# Patient Record
Sex: MALE | Race: WHITE
[De-identification: names, ages, dates, MRNs, and addresses within clinical notes are randomized per-mention and may not be internally consistent; named-entity substitution may affect disease eponyms.]

---

## 2017-01-14 ENCOUNTER — HOSPITAL ENCOUNTER (EMERGENCY)
Dept: HOSPITAL 84 - D.ER | Age: 67
Discharge: HOME | End: 2017-01-14
Payer: MEDICARE

## 2017-01-14 DIAGNOSIS — J20.9: ICD-10-CM

## 2017-01-14 DIAGNOSIS — R09.1: Primary | ICD-10-CM

## 2017-01-14 DIAGNOSIS — J44.9: ICD-10-CM

## 2017-01-14 DIAGNOSIS — M94.0: ICD-10-CM

## 2017-01-14 LAB
ALBUMIN SERPL-MCNC: 3.7 G/DL (ref 3.4–5)
ALP SERPL-CCNC: 93 U/L (ref 46–116)
ALT SERPL-CCNC: 28 U/L (ref 10–68)
ANION GAP SERPL CALC-SCNC: 10.4 MMOL/L (ref 8–16)
BASOPHILS NFR BLD AUTO: 0.5 % (ref 0–2)
BILIRUB SERPL-MCNC: 0.82 MG/DL (ref 0.2–1.3)
BUN SERPL-MCNC: 12 MG/DL (ref 7–18)
CALCIUM SERPL-MCNC: 9.8 MG/DL (ref 8.5–10.1)
CHLORIDE SERPL-SCNC: 104 MMOL/L (ref 98–107)
CHOLEST/HDLC SERPL: 4.6 RATIO (ref 2.3–4.9)
CK MB SERPL-MCNC: 1.1 U/L (ref 0–3.6)
CK SERPL-CCNC: 75 UL (ref 21–232)
CO2 SERPL-SCNC: 29.8 MMOL/L (ref 21–32)
CREAT SERPL-MCNC: 1.2 MG/DL (ref 0.6–1.3)
EOSINOPHIL NFR BLD: 2.7 % (ref 0–7)
ERYTHROCYTE [DISTWIDTH] IN BLOOD BY AUTOMATED COUNT: 12.1 % (ref 11.5–14.5)
GLOBULIN SER-MCNC: 3.5 G/L
GLUCOSE SERPL-MCNC: 109 MG/DL (ref 74–106)
HCT VFR BLD CALC: 47.6 % (ref 42–54)
HDLC SERPL-MCNC: 59 MG/DL (ref 32–96)
HGB BLD-MCNC: 16.2 G/DL (ref 13.5–17.5)
IMM GRANULOCYTES NFR BLD: 0.2 % (ref 0–5)
LDL-HDL RATIO: 3.2 RATIO (ref 1.5–3.5)
LDLC SERPL-MCNC: 191 MG/DL (ref 0–100)
LYMPHOCYTES NFR BLD AUTO: 30.2 % (ref 15–50)
MCH RBC QN AUTO: 32.7 PG (ref 26–34)
MCHC RBC AUTO-ENTMCNC: 34 G/DL (ref 31–37)
MCV RBC: 96 FL (ref 80–100)
MONOCYTES NFR BLD: 7.4 % (ref 2–11)
NEUTROPHILS NFR BLD AUTO: 59 % (ref 40–80)
OSMOLALITY SERPL CALC.SUM OF ELEC: 279 MOSM/KG (ref 275–300)
PLATELET # BLD: 145 10X3/UL (ref 130–400)
PMV BLD AUTO: 10.5 FL (ref 7.4–10.4)
POTASSIUM SERPL-SCNC: 4.2 MMOL/L (ref 3.5–5.1)
PROT SERPL-MCNC: 7.2 G/DL (ref 6.4–8.2)
RBC # BLD AUTO: 4.96 10X6/UL (ref 4.2–6.1)
SODIUM SERPL-SCNC: 140 MMOL/L (ref 136–145)
TRIGL SERPL-MCNC: 121 MG/DL (ref 30–200)
TROPONIN I SERPL-MCNC: < 0.017 NG/ML (ref 0–0.06)
WBC # BLD AUTO: 6.2 10X3/UL (ref 4.8–10.8)

## 2017-02-26 ENCOUNTER — HOSPITAL ENCOUNTER (EMERGENCY)
Dept: HOSPITAL 84 - D.ER | Age: 67
Discharge: HOME | End: 2017-02-26
Payer: MEDICARE

## 2017-02-26 DIAGNOSIS — J44.1: Primary | ICD-10-CM

## 2017-02-26 DIAGNOSIS — M94.0: ICD-10-CM

## 2017-02-26 DIAGNOSIS — R00.0: ICD-10-CM

## 2017-02-26 LAB
ALBUMIN SERPL-MCNC: 4.1 G/DL (ref 3.4–5)
ALP SERPL-CCNC: 107 U/L (ref 46–116)
ALT SERPL-CCNC: 32 U/L (ref 10–68)
ANION GAP SERPL CALC-SCNC: 14.8 MMOL/L (ref 8–16)
BASOPHILS NFR BLD AUTO: 0.4 % (ref 0–2)
BILIRUB SERPL-MCNC: 0.8 MG/DL (ref 0.2–1.3)
BUN SERPL-MCNC: 7 MG/DL (ref 7–18)
CALCIUM SERPL-MCNC: 9.2 MG/DL (ref 8.5–10.1)
CHLORIDE SERPL-SCNC: 103 MMOL/L (ref 98–107)
CO2 SERPL-SCNC: 28.1 MMOL/L (ref 21–32)
CREAT SERPL-MCNC: 1.4 MG/DL (ref 0.6–1.3)
EOSINOPHIL NFR BLD: 2.3 % (ref 0–7)
ERYTHROCYTE [DISTWIDTH] IN BLOOD BY AUTOMATED COUNT: 13 % (ref 11.5–14.5)
GLOBULIN SER-MCNC: 3.8 G/L
GLUCOSE SERPL-MCNC: 110 MG/DL (ref 74–106)
HCT VFR BLD CALC: 50.4 % (ref 42–54)
HGB BLD-MCNC: 17.3 G/DL (ref 13.5–17.5)
IMM GRANULOCYTES NFR BLD: 0.4 % (ref 0–5)
LYMPHOCYTES NFR BLD AUTO: 18.9 % (ref 15–50)
MCH RBC QN AUTO: 33.3 PG (ref 26–34)
MCHC RBC AUTO-ENTMCNC: 34.3 G/DL (ref 31–37)
MCV RBC: 97.1 FL (ref 80–100)
MONOCYTES NFR BLD: 12 % (ref 2–11)
NEUTROPHILS NFR BLD AUTO: 66 % (ref 40–80)
OSMOLALITY SERPL CALC.SUM OF ELEC: 281 MOSM/KG (ref 275–300)
PLATELET # BLD: 114 10X3/UL (ref 130–400)
PMV BLD AUTO: 10.6 FL (ref 7.4–10.4)
POTASSIUM SERPL-SCNC: 3.9 MMOL/L (ref 3.5–5.1)
PROT SERPL-MCNC: 7.9 G/DL (ref 6.4–8.2)
RBC # BLD AUTO: 5.19 10X6/UL (ref 4.2–6.1)
SODIUM SERPL-SCNC: 142 MMOL/L (ref 136–145)
WBC # BLD AUTO: 8.3 10X3/UL (ref 4.8–10.8)

## 2017-09-01 ENCOUNTER — HOSPITAL ENCOUNTER (OUTPATIENT)
Dept: HOSPITAL 84 - D.NM | Age: 67
Discharge: HOME | End: 2017-09-01
Attending: FAMILY MEDICINE
Payer: MEDICARE

## 2017-09-01 DIAGNOSIS — R07.89: Primary | ICD-10-CM

## 2017-10-14 ENCOUNTER — HOSPITAL ENCOUNTER (EMERGENCY)
Dept: HOSPITAL 84 - D.ER | Age: 67
Discharge: HOME | End: 2017-10-14
Payer: MEDICARE

## 2017-10-14 DIAGNOSIS — J44.9: ICD-10-CM

## 2017-10-14 DIAGNOSIS — R55: Primary | ICD-10-CM

## 2017-10-14 DIAGNOSIS — F10.129: ICD-10-CM

## 2017-10-14 LAB
ALBUMIN SERPL-MCNC: 3.9 G/DL (ref 3.4–5)
ALP SERPL-CCNC: 119 U/L (ref 46–116)
ALT SERPL-CCNC: 41 U/L (ref 10–68)
ANION GAP SERPL CALC-SCNC: 17.1 MMOL/L (ref 8–16)
BASOPHILS NFR BLD AUTO: 0.5 % (ref 0–2)
BILIRUB SERPL-MCNC: 0.68 MG/DL (ref 0.2–1.3)
BUN SERPL-MCNC: 12 MG/DL (ref 7–18)
CALCIUM SERPL-MCNC: 8.9 MG/DL (ref 8.5–10.1)
CHLORIDE SERPL-SCNC: 101 MMOL/L (ref 98–107)
CK SERPL-CCNC: 96 UL (ref 21–232)
CO2 SERPL-SCNC: 21.5 MMOL/L (ref 21–32)
CREAT SERPL-MCNC: 1.2 MG/DL (ref 0.6–1.3)
EOSINOPHIL NFR BLD: 4.8 % (ref 0–7)
ERYTHROCYTE [DISTWIDTH] IN BLOOD BY AUTOMATED COUNT: 12.8 % (ref 11.5–14.5)
GLOBULIN SER-MCNC: 3.7 G/L
GLUCOSE SERPL-MCNC: 87 MG/DL (ref 74–106)
HCT VFR BLD CALC: 47 % (ref 42–54)
HGB BLD-MCNC: 16.3 G/DL (ref 13.5–17.5)
IMM GRANULOCYTES NFR BLD: 0.1 % (ref 0–5)
LYMPHOCYTES NFR BLD AUTO: 29.9 % (ref 15–50)
MCH RBC QN AUTO: 32.9 PG (ref 26–34)
MCHC RBC AUTO-ENTMCNC: 34.7 G/DL (ref 31–37)
MCV RBC: 94.8 FL (ref 80–100)
MONOCYTES NFR BLD: 7.9 % (ref 2–11)
NEUTROPHILS NFR BLD AUTO: 56.8 % (ref 40–80)
OSMOLALITY SERPL CALC.SUM OF ELEC: 270 MOSM/KG (ref 275–300)
PLATELET # BLD: 164 10X3/UL (ref 130–400)
PMV BLD AUTO: 10.9 FL (ref 7.4–10.4)
POTASSIUM SERPL-SCNC: 3.6 MMOL/L (ref 3.5–5.1)
PROT SERPL-MCNC: 7.6 G/DL (ref 6.4–8.2)
RBC # BLD AUTO: 4.96 10X6/UL (ref 4.2–6.1)
SODIUM SERPL-SCNC: 136 MMOL/L (ref 136–145)
TROPONIN I SERPL-MCNC: < 0.017 NG/ML (ref 0–0.06)
WBC # BLD AUTO: 7.6 10X3/UL (ref 4.8–10.8)

## 2018-01-29 ENCOUNTER — HOSPITAL ENCOUNTER (OUTPATIENT)
Dept: HOSPITAL 84 - D.LABREF | Age: 68
Discharge: HOME | End: 2018-01-29
Attending: UROLOGY
Payer: MEDICARE

## 2018-01-29 DIAGNOSIS — N39.0: Primary | ICD-10-CM

## 2018-02-13 ENCOUNTER — HOSPITAL ENCOUNTER (OUTPATIENT)
Dept: HOSPITAL 84 - D.OPS | Age: 68
Discharge: HOME | End: 2018-02-13
Attending: UROLOGY
Payer: MEDICARE

## 2018-02-13 VITALS — BODY MASS INDEX: 30.4 KG/M2

## 2018-02-13 DIAGNOSIS — Z01.811: ICD-10-CM

## 2018-02-13 DIAGNOSIS — Z01.810: ICD-10-CM

## 2018-02-13 DIAGNOSIS — Z53.9: Primary | ICD-10-CM

## 2018-02-13 DIAGNOSIS — Z01.812: ICD-10-CM

## 2018-02-13 LAB
ERYTHROCYTE [DISTWIDTH] IN BLOOD BY AUTOMATED COUNT: 12.9 % (ref 11.5–14.5)
HCT VFR BLD CALC: 46.5 % (ref 42–54)
HGB BLD-MCNC: 15.8 G/DL (ref 13.5–17.5)
MCH RBC QN AUTO: 32.6 PG (ref 26–34)
MCHC RBC AUTO-ENTMCNC: 34 G/DL (ref 31–37)
MCV RBC: 95.9 FL (ref 80–100)
PLATELET # BLD: 149 10X3/UL (ref 130–400)
PMV BLD AUTO: 10.5 FL (ref 7.4–10.4)
RBC # BLD AUTO: 4.85 10X6/UL (ref 4.2–6.1)
WBC # BLD AUTO: 9.4 10X3/UL (ref 4.8–10.8)

## 2018-02-20 ENCOUNTER — HOSPITAL ENCOUNTER (OUTPATIENT)
Dept: HOSPITAL 84 - D.CATH | Age: 68
Discharge: HOME | End: 2018-02-20
Attending: INTERNAL MEDICINE
Payer: MEDICARE

## 2018-02-20 VITALS
WEIGHT: 200.42 LBS | HEIGHT: 68 IN | BODY MASS INDEX: 30.38 KG/M2 | WEIGHT: 200.42 LBS | BODY MASS INDEX: 30.38 KG/M2 | HEIGHT: 68 IN

## 2018-02-20 VITALS — DIASTOLIC BLOOD PRESSURE: 94 MMHG | SYSTOLIC BLOOD PRESSURE: 159 MMHG

## 2018-02-20 DIAGNOSIS — I20.9: Primary | ICD-10-CM

## 2018-02-20 DIAGNOSIS — R94.31: ICD-10-CM

## 2018-02-20 DIAGNOSIS — J44.9: ICD-10-CM

## 2018-02-20 DIAGNOSIS — Z01.812: ICD-10-CM

## 2018-02-20 LAB
ANION GAP SERPL CALC-SCNC: 14.9 MMOL/L (ref 8–16)
BUN SERPL-MCNC: 16 MG/DL (ref 7–18)
CALCIUM SERPL-MCNC: 8.9 MG/DL (ref 8.5–10.1)
CHLORIDE SERPL-SCNC: 103 MMOL/L (ref 98–107)
CO2 SERPL-SCNC: 22.6 MMOL/L (ref 21–32)
CREAT SERPL-MCNC: 1.2 MG/DL (ref 0.6–1.3)
ERYTHROCYTE [DISTWIDTH] IN BLOOD BY AUTOMATED COUNT: 12.6 % (ref 11.5–14.5)
GLUCOSE SERPL-MCNC: 109 MG/DL (ref 74–106)
HCT VFR BLD CALC: 42.8 % (ref 42–54)
HGB BLD-MCNC: 15.1 G/DL (ref 13.5–17.5)
LYMPHOCYTES NFR BLD AUTO: 13.9 % (ref 15–50)
MCH RBC QN AUTO: 31.9 PG (ref 26–34)
MCHC RBC AUTO-ENTMCNC: 35.3 G/DL (ref 31–37)
MCV RBC: 90.5 FL (ref 80–100)
NEUTROPHILS NFR BLD AUTO: 79.4 % (ref 40–80)
OSMOLALITY SERPL CALC.SUM OF ELEC: 275 MOSM/KG (ref 275–300)
PLATELET # BLD: 136 10X3/UL (ref 130–400)
PMV BLD AUTO: 10.4 FL (ref 7.4–10.4)
POTASSIUM SERPL-SCNC: 3.5 MMOL/L (ref 3.5–5.1)
RBC # BLD AUTO: 4.73 10X6/UL (ref 4.2–6.1)
SODIUM SERPL-SCNC: 137 MMOL/L (ref 136–145)
WBC # BLD AUTO: 9 10X3/UL (ref 4.8–10.8)

## 2018-02-21 ENCOUNTER — HOSPITAL ENCOUNTER (OUTPATIENT)
Dept: HOSPITAL 84 - D.RT | Age: 68
Discharge: HOME | End: 2018-02-21
Attending: INTERNAL MEDICINE
Payer: MEDICARE

## 2018-02-21 VITALS — BODY MASS INDEX: 30.4 KG/M2

## 2018-02-21 DIAGNOSIS — R06.02: Primary | ICD-10-CM

## 2018-03-15 ENCOUNTER — HOSPITAL ENCOUNTER (OUTPATIENT)
Dept: HOSPITAL 84 - D.LABREF | Age: 68
Discharge: HOME | End: 2018-03-15
Attending: INTERNAL MEDICINE
Payer: MEDICARE

## 2018-03-15 VITALS — BODY MASS INDEX: 30.4 KG/M2

## 2018-03-15 DIAGNOSIS — J44.9: Primary | ICD-10-CM

## 2018-03-15 LAB
BASOPHILS NFR BLD AUTO: 0.2 % (ref 0–2)
EOSINOPHIL NFR BLD: 0.9 % (ref 0–7)
ERYTHROCYTE [DISTWIDTH] IN BLOOD BY AUTOMATED COUNT: 14.7 % (ref 11.5–14.5)
HCT VFR BLD CALC: 49.9 % (ref 42–54)
HGB BLD-MCNC: 16.4 G/DL (ref 13.5–17.5)
IMM GRANULOCYTES NFR BLD: 0.6 % (ref 0–5)
LYMPHOCYTES NFR BLD AUTO: 20.9 % (ref 15–50)
MCH RBC QN AUTO: 32.5 PG (ref 26–34)
MCHC RBC AUTO-ENTMCNC: 32.9 G/DL (ref 31–37)
MCV RBC: 99 FL (ref 80–100)
MONOCYTES NFR BLD: 9.2 % (ref 2–11)
NEUTROPHILS NFR BLD AUTO: 68.2 % (ref 40–80)
PLATELET # BLD: 134 10X3/UL (ref 130–400)
PMV BLD AUTO: 11.3 FL (ref 7.4–10.4)
RBC # BLD AUTO: 5.04 10X6/UL (ref 4.2–6.1)
WBC # BLD AUTO: 8.7 10X3/UL (ref 4.8–10.8)

## 2018-04-11 ENCOUNTER — HOSPITAL ENCOUNTER (OUTPATIENT)
Dept: HOSPITAL 84 - D.CT | Age: 68
Discharge: HOME | End: 2018-04-11
Attending: INTERNAL MEDICINE
Payer: MEDICARE

## 2018-04-11 VITALS — BODY MASS INDEX: 30.4 KG/M2

## 2018-04-11 DIAGNOSIS — R06.00: Primary | ICD-10-CM

## 2018-07-25 ENCOUNTER — HOSPITAL ENCOUNTER (OUTPATIENT)
Dept: HOSPITAL 84 - D.OPS | Age: 68
Discharge: HOME | End: 2018-07-25
Attending: UROLOGY
Payer: MEDICARE

## 2018-07-25 VITALS — BODY MASS INDEX: 30.01 KG/M2 | HEIGHT: 68 IN | WEIGHT: 198 LBS | BODY MASS INDEX: 30.01 KG/M2

## 2018-07-25 VITALS — DIASTOLIC BLOOD PRESSURE: 89 MMHG | SYSTOLIC BLOOD PRESSURE: 138 MMHG

## 2018-07-25 DIAGNOSIS — R39.15: ICD-10-CM

## 2018-07-25 DIAGNOSIS — R97.20: Primary | ICD-10-CM

## 2018-07-25 DIAGNOSIS — R39.12: ICD-10-CM

## 2018-07-25 DIAGNOSIS — N42.31: ICD-10-CM

## 2018-07-25 DIAGNOSIS — Z01.812: ICD-10-CM

## 2018-07-25 DIAGNOSIS — R35.0: ICD-10-CM

## 2018-07-25 DIAGNOSIS — N40.1: ICD-10-CM

## 2018-07-25 LAB
ERYTHROCYTE [DISTWIDTH] IN BLOOD BY AUTOMATED COUNT: 15.1 % (ref 11.5–14.5)
HCT VFR BLD CALC: 43.1 % (ref 42–54)
HGB BLD-MCNC: 14.9 G/DL (ref 13.5–17.5)
MCH RBC QN AUTO: 34 PG (ref 26–34)
MCHC RBC AUTO-ENTMCNC: 34.6 G/DL (ref 31–37)
MCV RBC: 98.4 FL (ref 80–100)
PLATELET # BLD: 140 10X3/UL (ref 130–400)
PMV BLD AUTO: 10.1 FL (ref 7.4–10.4)
RBC # BLD AUTO: 4.38 10X6/UL (ref 4.2–6.1)
WBC # BLD AUTO: 6.5 10X3/UL (ref 4.8–10.8)

## 2019-01-21 ENCOUNTER — HOSPITAL ENCOUNTER (OUTPATIENT)
Dept: HOSPITAL 84 - D.MRI | Age: 69
Discharge: HOME | End: 2019-01-21
Attending: FAMILY MEDICINE
Payer: MEDICARE

## 2019-01-21 VITALS — BODY MASS INDEX: 30.1 KG/M2

## 2019-01-21 DIAGNOSIS — R42: Primary | ICD-10-CM

## 2019-03-21 ENCOUNTER — HOSPITAL ENCOUNTER (OUTPATIENT)
Dept: HOSPITAL 84 - D.HCCARDIO | Age: 69
Discharge: HOME | End: 2019-03-21
Attending: INTERNAL MEDICINE
Payer: MEDICARE

## 2019-03-21 VITALS — BODY MASS INDEX: 30.1 KG/M2

## 2019-03-21 DIAGNOSIS — I25.10: Primary | ICD-10-CM

## 2019-03-24 NOTE — EC
PATIENT:ARIAS ZUÑIGA                  DATE OF SERVICE: 03/21/19
SEX: M                                  MEDICAL RECORD: [id number]
DATE OF BIRTH: [birth year]                        [facility]          
AGE OF PATIENT: 68                             ADMISSION DATE: 03/21/19
 
REFERRING PHYSICIAN:                               
 
INTERPRETING PHYSICIAN: ROSE CASH MD          
 
 
 
                             ECHOCARDIOGRAM REPORT
  ECHO CHARGES 4               ECHO COMPLETE                 Date: 03/21/19
 
 
 
CLINICAL DIAGNOSIS: CAD    HX OF CAD/COPD/HTN     
 
                         ECHOCARDIOGRAPHIC MEASUREMENTS
      (adult normal given)
   AC root (d.<3.7cm) 4.8  cm   LV Septum d (<1.2 cm> 1.7  cm
      Valve Excursion 2.0  cm     LV Septum (systole) 2.0  cm
Left Atria (s.<4.0cm> 3.1  cm          LVPW d(<1.2cm) 1.8  cm
        RV (d.<2.3cm) 2.6  cm           LVPW (sytole) 2.2  cm
  LV diastole(<5.6CM) 5.3  cm       MV E-F(>70mm/sec)      cm
           LV systole 3.5  cm           LVOT Diameter 2.3  cm
       MV exc.(>10mm)      cm
Est.ejection fraction (50-75%)     %
 
   DOPPLER:
     LVIT      cm/sec A 96.0 cm/sec E 81.0  cm/sec
       LA      cm/sec      RVSP 21   mmHg
     LVOT 110  cm/sec   AOP1/2T 654  m/s
  Asc. Ao 118  cm/sec
     RVOT      cm/sec
       RA      cm/sec
       PA      cm/sec
 AV Gradient Peak 5.59 mmHg  AV Mean 2.94 mmHg  AV Area 4.2  cm
 MV Gradient Peak 5.34 mmHg  MV Mean 2.01 mmHg  MV Area      cm
   COMMENTS:                                              
 
 
 Cardiac Sonographer: 2               DMITRI CRAWFORD              
      Cardiologist: 3          Dr. Adame             
             TAPE# PACS           
                                       Pericardial Effusion N                        
 
 
DATE OF SERVICE:  
 
Adequate 2-D, color-flow and spectral Doppler, and M-mode. 
 
LVH is present.  LV internal dimensions are normal.  Wall motion is normal.  EF
is greater than or equal to 55%.  Aortic valve is tricuspid.  No evidence of
stenosis by Doppler interrogation.  Left atrium is normal at 3.7 cm.  Mitral
valve shows no prolapse.  Trace MR.  Right-sided chambers are grossly normal. 
Trace TR.
 
 
 
 
ECHOCARDIOGRAM REPORT                          [id number]    ARIAS ZUÑIGA          
 
 
TRANSINT:[id number] Voice Confirmation ID: [id number] DOCUMENT ID: [id number]
                                           
                                           ROSE CASH MD          
 
 
 
Electronically Signed by ROSE CASH on 03/24/19 at 1039
 
 
 
 
 
 
 
 
 
 
 
 
 
 
 
 
 
 
 
 
 
 
 
 
 
 
 
 
 
 
 
 
 
 
 
 
 
 
 
CC:                                                             [id number]
DICTATION DATE: 03/22/19 1203     :     03/22/19 1240      DEP CLI 
                                                                      03/21/19
[place] [id number] [place] [phone]

## 2019-12-14 ENCOUNTER — HOSPITAL ENCOUNTER (EMERGENCY)
Dept: HOSPITAL 84 - D.ER | Age: 69
Discharge: HOME | End: 2019-12-14
Payer: MEDICARE

## 2019-12-14 VITALS — HEIGHT: 68 IN | BODY MASS INDEX: 33.41 KG/M2 | WEIGHT: 220.46 LBS

## 2019-12-14 VITALS — DIASTOLIC BLOOD PRESSURE: 72 MMHG | SYSTOLIC BLOOD PRESSURE: 122 MMHG

## 2019-12-14 DIAGNOSIS — R33.9: Primary | ICD-10-CM

## 2019-12-14 LAB
APPEARANCE UR: CLEAR
BILIRUB SERPL-MCNC: NEGATIVE MG/DL
COLOR UR: YELLOW
GLUCOSE SERPL-MCNC: NEGATIVE MG/DL
KETONES UR STRIP-MCNC: NEGATIVE MG/DL
NITRITE UR-MCNC: NEGATIVE MG/ML
PH UR STRIP: 5 [PH] (ref 5–6)
PROT UR-MCNC: NEGATIVE MG/DL
SP GR UR STRIP: 1.01 (ref 1–1.02)
UROBILINOGEN UR-MCNC: NORMAL MG/DL

## 2020-01-21 LAB
ERYTHROCYTE [DISTWIDTH] IN BLOOD BY AUTOMATED COUNT: 13 % (ref 11.5–14.5)
HCT VFR BLD CALC: 50.7 % (ref 42–54)
HGB BLD-MCNC: 17 G/DL (ref 13.5–17.5)
MCH RBC QN AUTO: 31.7 PG (ref 26–34)
MCHC RBC AUTO-ENTMCNC: 33.5 G/DL (ref 31–37)
MCV RBC: 94.4 FL (ref 80–100)
PLATELET # BLD: 173 10X3/UL (ref 130–400)
PMV BLD AUTO: 10.3 FL (ref 7.4–10.4)
RBC # BLD AUTO: 5.37 10X6/UL (ref 4.2–6.1)
WBC # BLD AUTO: 7.6 10X3/UL (ref 4.8–10.8)

## 2020-01-23 ENCOUNTER — HOSPITAL ENCOUNTER (OUTPATIENT)
Dept: HOSPITAL 84 - D.OPS | Age: 70
Discharge: HOME | End: 2020-01-23
Attending: UROLOGY
Payer: MEDICARE

## 2020-01-23 VITALS — SYSTOLIC BLOOD PRESSURE: 132 MMHG | DIASTOLIC BLOOD PRESSURE: 82 MMHG

## 2020-01-23 VITALS
BODY MASS INDEX: 31.83 KG/M2 | HEIGHT: 68 IN | HEIGHT: 68 IN | BODY MASS INDEX: 31.83 KG/M2 | WEIGHT: 210 LBS | WEIGHT: 210 LBS

## 2020-01-23 DIAGNOSIS — I10: ICD-10-CM

## 2020-01-23 DIAGNOSIS — R33.9: ICD-10-CM

## 2020-01-23 DIAGNOSIS — N40.1: ICD-10-CM

## 2020-01-23 DIAGNOSIS — D49.4: Primary | ICD-10-CM

## 2020-01-23 DIAGNOSIS — E78.5: ICD-10-CM

## 2020-01-23 DIAGNOSIS — I25.10: ICD-10-CM

## 2020-01-23 DIAGNOSIS — R94.31: ICD-10-CM

## 2020-01-23 DIAGNOSIS — J44.9: ICD-10-CM

## 2020-01-23 PROCEDURE — 52204 CYSTOSCOPY W/BIOPSY(S): CPT

## 2020-01-23 NOTE — NUR
1043-DISCHARGE CRITERIA MET.REMOVED IV WITH CATH INTACT,DISPOSED INTO
SHARPS,COVERED SITE WITH GUAZE,SECURED WITH MEDIPORE TAPE. REVIEWED
POST OPERATIVE INSTRUCTIONS,FELICIANO CARE,AND FOLLOW UP
APPOINTMENT.VERBALIZED UNDERSTANDING. ESCORTED OUT VIA W/C WITH
DAUGHTER TO [facility]

## 2020-01-23 NOTE — OP
PATIENT NAME:  ARIAS ZUÑIGA                            MEDICAL RECORD: [id number]
:50                                             LOCATION:[facility]          
                                                         ADMISSION DATE:        
SURGEON:  VIKRAM HATFIELD MD              
 
 
DATE OF OPERATION:  2020
 
SURGEON:  Vikram Hatfield MD
 
ANESTHESIA:  TIVA by Josh Ramirez CRNA.
 
DIAGNOSES:
1.  Bladder tumor
2.  Obstructive BPH.  PSA 29.2.  Transrectal ultrasound showed a 29 gram
prostate.  Pathology on prostate biopsy was benign.  IPSS score is 34 and
quality of life score is 5 on tamsulosin.  Postvoid residual was 324 mL.
 
FINDINGS:  On cystoscopy, areas of petechial hemorrhage on the bladder wall. 
These were biopsied.  Large obstructive lateral lobes of the prostate.  Single
ureteral orifices bilaterally.
 
SPECIMENS:  Bladder biopsy times 2.
 
PROCEDURE:  Bladder biopsy times 2-and UroLift times 6 units deployed and 5
held.
 
ESTIMATED BLOOD LOSS:  None.
 
CLINICAL HISTORY:  This is a 69-year-old male, who was referred with an elevated
PSA of 29.2.  He had a prostate biopsy, which was benign.  Transrectal
ultrasound showed a 29 gram prostate.  He has significant voiding symptoms
including urinary frequency every 10-15 minutes and nocturia times 10-12.  He
has incomplete bladder emptying with a postvoid residual of 324 mL with
tamsulosin on board.  He has never smoked, but he does have oxygen dependent
emphysema.  He has been exposed to asbestos in the past.  HE IS ALLERGIC TO
SULFA.  He was given Levaquin IV on call to the OR.
 
DESCRIPTION OF PROCEDURE:  The patient was given IV sedation.  He was then
placed in the lithotomy position and prepped and draped.  The UroLift scope was
used initially.  We then found an area of red cell clusters on the dome and
anterior wall of the bladder.  We switched over to the regular cystoscope and
with the biopsy forceps, we biopsied 2 of these areas.  The lesion on the dome
was biopsied as well as a lesion on the anterior wall of the bladder.  These
were sent to pathology in formalin.  We then switched back to the UroLift scope.
 At the anterior lateral sulcus 1.5 cm distal to the bladder neck, we placed 1
UroLift unit on each side.  On the right side, I initially struck bone and this
unit misfired.  I placed another unit in this area and it worked fine.  The
anterolateral sulcus on each side,one unit was placed at the verumontanum level.
 Looking in with the obturator, there was still some obstruction in the mid
prosthetic region.  Therefore, on the right side at the mid urethral level,
another UroLift unit was placed.  Thus, he has a total of 5 units in place.  Six
units had been fired with 1 bone strike creating a misfire.  Because of the
bladder biopsy that I had performed, I placed a 16-French Ibarra catheter and
inflated the balloon with 10 cc of sterile water.  I will see the patient in
followup next week to remove the catheter for a voiding trial.
 
TRANSINT:[id number] Voice Confirmation ID: [id number] DOCUMENT ID: [id number]
 
 
 
OPERATIVE REPORT                               [id number]    ARIAS ZUÑIGA ROBERT S MD              
 
 
 
Electronically Signed by VIKRAM HATFIELD on 20 at 1207
 
 
 
 
 
 
 
 
 
 
 
 
 
 
 
 
 
 
 
 
 
 
 
 
 
 
 
 
 
 
 
 
 
 
 
 
 
 
 
 
 
CC:                                                             [id number]
DICTATION DATE: 20 0954     :     20 1203      PRE [facility]                                          
[address] [place] [address]

## 2020-06-04 ENCOUNTER — HOSPITAL ENCOUNTER (OUTPATIENT)
Dept: HOSPITAL 84 - D.HCCECHO | Age: 70
Discharge: HOME | End: 2020-06-04
Attending: INTERNAL MEDICINE
Payer: MEDICARE

## 2020-06-04 VITALS — BODY MASS INDEX: 32 KG/M2

## 2020-06-04 DIAGNOSIS — I25.10: Primary | ICD-10-CM
